# Patient Record
Sex: FEMALE | NOT HISPANIC OR LATINO
[De-identification: names, ages, dates, MRNs, and addresses within clinical notes are randomized per-mention and may not be internally consistent; named-entity substitution may affect disease eponyms.]

---

## 2017-10-13 ENCOUNTER — APPOINTMENT (OUTPATIENT)
Dept: HEART AND VASCULAR | Facility: CLINIC | Age: 67
End: 2017-10-13

## 2018-11-14 ENCOUNTER — APPOINTMENT (OUTPATIENT)
Dept: ORTHOPEDIC SURGERY | Facility: CLINIC | Age: 68
End: 2018-11-14
Payer: MEDICARE

## 2018-11-14 VITALS — BODY MASS INDEX: 26.46 KG/M2 | HEIGHT: 64 IN | WEIGHT: 155 LBS

## 2018-11-14 DIAGNOSIS — M20.22 HALLUX RIGIDUS, LEFT FOOT: ICD-10-CM

## 2018-11-14 DIAGNOSIS — M19.079 PRIMARY OSTEOARTHRITIS, UNSPECIFIED ANKLE AND FOOT: ICD-10-CM

## 2018-11-14 PROCEDURE — 99213 OFFICE O/P EST LOW 20 MIN: CPT

## 2018-11-14 PROCEDURE — 73630 X-RAY EXAM OF FOOT: CPT | Mod: LT

## 2020-11-09 ENCOUNTER — TRANSCRIPTION ENCOUNTER (OUTPATIENT)
Age: 70
End: 2020-11-09

## 2020-11-13 ENCOUNTER — APPOINTMENT (OUTPATIENT)
Dept: ORTHOPEDIC SURGERY | Facility: CLINIC | Age: 70
End: 2020-11-13
Payer: MEDICARE

## 2020-11-13 ENCOUNTER — APPOINTMENT (OUTPATIENT)
Dept: ORTHOPEDIC SURGERY | Facility: CLINIC | Age: 70
End: 2020-11-13

## 2020-11-13 VITALS — RESPIRATION RATE: 16 BRPM | BODY MASS INDEX: 26.46 KG/M2 | WEIGHT: 155 LBS | HEIGHT: 64 IN

## 2020-11-13 DIAGNOSIS — S69.91XD UNSPECIFIED INJURY OF RIGHT WRIST, HAND AND FINGER(S), SUBSEQUENT ENCOUNTER: ICD-10-CM

## 2020-11-13 PROCEDURE — 99072 ADDL SUPL MATRL&STAF TM PHE: CPT

## 2020-11-13 PROCEDURE — 73110 X-RAY EXAM OF WRIST: CPT | Mod: 50

## 2020-11-13 PROCEDURE — 99203 OFFICE O/P NEW LOW 30 MIN: CPT

## 2020-11-13 PROCEDURE — 73140 X-RAY EXAM OF FINGER(S): CPT | Mod: FA,F5

## 2020-11-19 ENCOUNTER — NON-APPOINTMENT (OUTPATIENT)
Age: 70
End: 2020-11-19

## 2021-08-08 ENCOUNTER — EMERGENCY (EMERGENCY)
Facility: HOSPITAL | Age: 71
LOS: 1 days | Discharge: HOME | End: 2021-08-08
Attending: EMERGENCY MEDICINE | Admitting: EMERGENCY MEDICINE
Payer: MEDICARE

## 2021-08-08 VITALS
OXYGEN SATURATION: 96 % | SYSTOLIC BLOOD PRESSURE: 119 MMHG | HEART RATE: 80 BPM | DIASTOLIC BLOOD PRESSURE: 67 MMHG | WEIGHT: 149.91 LBS | TEMPERATURE: 98 F | RESPIRATION RATE: 18 BRPM

## 2021-08-08 VITALS
HEART RATE: 67 BPM | SYSTOLIC BLOOD PRESSURE: 149 MMHG | RESPIRATION RATE: 16 BRPM | OXYGEN SATURATION: 96 % | TEMPERATURE: 98 F | DIASTOLIC BLOOD PRESSURE: 71 MMHG

## 2021-08-08 DIAGNOSIS — M25.551 PAIN IN RIGHT HIP: ICD-10-CM

## 2021-08-08 DIAGNOSIS — M25.461 EFFUSION, RIGHT KNEE: ICD-10-CM

## 2021-08-08 PROCEDURE — 73502 X-RAY EXAM HIP UNI 2-3 VIEWS: CPT | Mod: 26,RT

## 2021-08-08 PROCEDURE — 99284 EMERGENCY DEPT VISIT MOD MDM: CPT | Mod: 25

## 2021-08-08 PROCEDURE — 73700 CT LOWER EXTREMITY W/O DYE: CPT | Mod: 26,RT

## 2021-08-08 PROCEDURE — 73630 X-RAY EXAM OF FOOT: CPT | Mod: 26,RT

## 2021-08-08 PROCEDURE — 73562 X-RAY EXAM OF KNEE 3: CPT | Mod: 26,RT

## 2021-08-08 PROCEDURE — 71101 X-RAY EXAM UNILAT RIBS/CHEST: CPT | Mod: 26,RT

## 2021-08-08 RX ORDER — ACETAMINOPHEN 500 MG
650 TABLET ORAL ONCE
Refills: 0 | Status: COMPLETED | OUTPATIENT
Start: 2021-08-08 | End: 2021-08-08

## 2021-08-08 RX ADMIN — Medication 500 MILLIGRAM(S): at 11:42

## 2021-08-08 RX ADMIN — Medication 650 MILLIGRAM(S): at 11:42

## 2021-08-08 NOTE — ED ADULT TRIAGE NOTE - CHIEF COMPLAINT QUOTE
trip and fall last night. Injuring right hip/ knee and foot. Right knee appears swollen, using crutches unable to tolerate weight on leg.

## 2021-08-08 NOTE — ED ADULT NURSE NOTE - NSIMPLEMENTINTERV_GEN_ALL_ED
Implemented All Fall with Harm Risk Interventions:  [place] to call system. Call bell, personal items and telephone within reach. Instruct patient to call for assistance. Room bathroom lighting operational. Non-slip footwear when patient is off stretcher. Physically safe environment: no spills, clutter or unnecessary equipment. Stretcher in lowest position, wheels locked, appropriate side rails in place. Provide visual cue, wrist band, yellow gown, etc. Monitor gait and stability. Monitor for mental status changes and reorient to person, place, and time. Review medications for side effects contributing to fall risk. Reinforce activity limits and safety measures with patient and family. Provide visual clues: red socks.

## 2021-08-08 NOTE — ED PROVIDER NOTE - PHYSICAL EXAMINATION
RLE: no pain with internal/external rotation of right lower extremity; can extend and elevate right lower extremity off the bed, limited flexion of the right knee.

## 2021-08-08 NOTE — ED PROVIDER NOTE - NSFOLLOWUPINSTRUCTIONS_ED_ALL_ED_FT
KEEP ACE WRAP IN PLACE FOR 7 DAYS FOR COMFORT AND SWELLING.   APPLY ICE FOR 15-20 MINUTES EVERY 1-2 HOURS FOR SWELLING AND COMFORT.   TAKE IBUPROFEN 600MG EVERY 6 HOURS WITH FOOD AS NEEDED FOR PAIN FOR 2-3 DAYS.   TAKE TYLENOL 650MG EVERY 6 HOURS AS NEEDED FOR PAIN FOR 2-3 DAYS.   RETURN TO ER FOR ANY NEW OR CONCERNING SYMPTOMS.   FOLLOW UP WITH YOUR PMD IN 2-3 DAYS.  IF PAIN IS NOT IMPROVING IN 2-3 DAYS, FOLLOW UP WITH DR Villalobos

## 2021-08-08 NOTE — ED PROVIDER NOTE - PATIENT PORTAL LINK FT
You can access the FollowMyHealth Patient Portal offered by [facility] by registering at the following website: [email or web address]. By joining [facility]’s FollowMyHealth portal, you will also be able to view your health information using other applications (apps) compatible with our system.

## 2021-08-08 NOTE — ED PROVIDER NOTE - MUSCULOSKELETAL, MLM
RLE: no pain with internal/external rotation of right lower extremity; can extend and elevate right lower extremity off the bed, limited flexion of the right knee. RLE: No rotation or shortening of the RLE.  no pain with internal/external rotation of right lower extremity; can extend and elevate right lower extremity off the bed, limited flexion of the right knee due to pain ~ 30 deg.  Dorsiflexion/plantar flexion 5/5.  Sensation grossly normal.  Small patellar effusion present.

## 2021-08-08 NOTE — ED PROVIDER NOTE - CLINICAL SUMMARY MEDICAL DECISION MAKING FREE TEXT BOX
70 yo female presents ~ 18 hours after fall from ~ 2 feet with R sided body pain.  Denies head injury, dizziness, headache.  no blood thinning medication use.  On exam: Non focal neurological exam.  no midline C or L spine tenderness.  No RLE shortening.  Small patellar effusion present.  No pain with internal or external rotation of the hip.  extensor mechanism intact.  reviewed imaging (including CT scan).  Spoke with her orthopedist Dr. Villalobos.  Conservative management discussed with the patient in detail - ace wrap, WBAT, OTC pain medication.  Close follow up encouraged.  Strict ED return instructions discussed in detail and patient given the opportunity to ask any questions about their discharge diagnosis and instructions

## 2021-08-08 NOTE — ED PROVIDER NOTE - OBJECTIVE STATEMENT
72 yo female   Patient denies prodromal symptoms; No LOC, headache, dizziness, N/V after the fall. Patient states she has been able to ambulate with pain.   no anti-coagulant use or allergies to medications.   Patient took Tylenol last night without major improvement of pain 72 yo female with history of right ankle arthroscopic surgery in the past presents to the ED complaining of right hip, knee and ankle pain after a mechanical fall last night at 8PM. Patient states she was on a step stool, putting something away in a cupboard when her foot got caught on the baggy pants she was wearing and she fell off the stool (3 steps, approximately 2ft) landing on her right side. Patient denies prodromal symptoms; No head trauma, LOC, headache, dizziness, N/V after the fall. Patient states she has been able to ambulate with the assistance of crutches due to pain. Patient reports no anti-coagulant use or allergies to medications. Patient took Tylenol last night with minimal improvement of pain. 70 yo female with history of right ankle arthroscopic surgery in the past presents to the ED complaining of right hip, knee and ankle pain after a mechanical fall last night at 8PM. Patient states she was on a step stool, putting something away in a cupboard when her foot got caught on the baggy pants she was wearing and she fell off the stool (3 steps, approximately 2ft) landing on her right side. Patient denies prodromal symptoms; No head trauma, LOC, neck/back/abdominal/chest pain, headache, dizziness, N/V, vision changes after the fall. Patient states she has been able to ambulate with the assistance of crutches due to pain. Patient reports no anti-coagulant use or allergies to medications. Patient took Tylenol last night with minimal improvement of pain.

## 2021-08-08 NOTE — ED ADULT NURSE REASSESSMENT NOTE - NS ED NURSE REASSESS COMMENT FT1
Pt received from virgie MINER. Pt resting comfortably in bed. No s/s of acute distress. Will continue to monitor

## 2022-07-18 ENCOUNTER — APPOINTMENT (OUTPATIENT)
Dept: ORTHOPEDIC SURGERY | Facility: CLINIC | Age: 72
End: 2022-07-18

## 2022-07-18 VITALS — RESPIRATION RATE: 16 BRPM | HEIGHT: 64 IN | BODY MASS INDEX: 26.46 KG/M2 | WEIGHT: 155 LBS

## 2022-07-18 DIAGNOSIS — M67.471 GANGLION, RIGHT ANKLE AND FOOT: ICD-10-CM

## 2022-07-18 DIAGNOSIS — M79.671 PAIN IN RIGHT FOOT: ICD-10-CM

## 2022-07-18 PROCEDURE — 99203 OFFICE O/P NEW LOW 30 MIN: CPT

## 2022-07-18 PROCEDURE — 76882 US LMTD JT/FCL EVL NVASC XTR: CPT

## 2022-07-25 PROBLEM — M79.671 RIGHT FOOT PAIN: Status: ACTIVE | Noted: 2022-07-18

## 2023-02-22 ENCOUNTER — NON-APPOINTMENT (OUTPATIENT)
Age: 73
End: 2023-02-22

## 2023-05-18 ENCOUNTER — APPOINTMENT (OUTPATIENT)
Dept: ORTHOPEDIC SURGERY | Facility: CLINIC | Age: 73
End: 2023-05-18

## 2024-09-08 NOTE — ED PROVIDER NOTE - SKIN, MLM
PAIN SCALE 7 OF 10. Skin normal color for race, warm, dry and intact. No evidence of rash. No evidence of rash.

## 2024-12-18 ENCOUNTER — EMERGENCY (EMERGENCY)
Facility: HOSPITAL | Age: 74
LOS: 1 days | Discharge: HOME | End: 2024-12-18
Admitting: EMERGENCY MEDICINE
Payer: MEDICARE

## 2024-12-18 VITALS
HEART RATE: 75 BPM | DIASTOLIC BLOOD PRESSURE: 84 MMHG | RESPIRATION RATE: 16 BRPM | SYSTOLIC BLOOD PRESSURE: 126 MMHG | OXYGEN SATURATION: 97 % | TEMPERATURE: 98 F

## 2024-12-18 PROCEDURE — 73130 X-RAY EXAM OF HAND: CPT | Mod: 26,RT

## 2024-12-18 PROCEDURE — 73140 X-RAY EXAM OF FINGER(S): CPT | Mod: 26,XE,RT

## 2024-12-18 PROCEDURE — 99283 EMERGENCY DEPT VISIT LOW MDM: CPT

## 2024-12-18 NOTE — ED ADULT NURSE NOTE - OBJECTIVE STATEMENT
pt c/o rght thumb pain since yesterday. Denies fall or trauma. Hx surgery on that thumb 20 yrs prior. no obvious deformity/discoloration/swelling.

## 2024-12-18 NOTE — ED ADULT NURSE NOTE - SUICIDE SCREENING QUESTION 3
No Informed pt about surgery time 0930, last meal 0000 no dairy, and last drink 0630 of water or apple juice 3 hrs before surgery. Bring photo ID and isurance card to the first floor. Must have an escort that is 18+. Leave jewelry, piercings, and contacts at home./yes

## 2024-12-18 NOTE — ED PROVIDER NOTE - NSFOLLOWUPINSTRUCTIONS_ED_ALL_ED_FT
no complications
Finger Sprain, Adult  A finger sprain is a tear or stretch in a ligament in a finger. Ligaments are tissues that connect bones to each other.    What are the causes?  Finger sprains happen when something makes the bones in the hand move in an abnormal way. They are often caused by a fall or an accident.    What increases the risk?  This condition is more likely to develop in people who:  Participate in sports in which it is easy to fall, such as skiing.  Play sports that involve catching an object, such as basketball.  Have poor strength and flexibility.  What are the signs or symptoms?  Symptoms of this condition include:  Pain or tenderness in the finger.  Swelling in the finger.  A bluish appearance to the finger.  Bruising.  Difficulty bending and flexing the finger.  How is this diagnosed?  This condition is diagnosed with an exam of your finger. Your health care provider may take an X-ray to see if any bones are broken or dislocated.    How is this treated?  Hand showing finger splint on index and middle fingers and wrist.  Treatment for this condition depends on how severe the sprain is. It may involve:  Preventing the finger from moving for a period of time. Your finger may be wrapped in a bandage (dressing) or splint, or your finger may be taped to the fingers beside it (kirsten taping).  Medicines for pain.  Exercises to strengthen the finger. These may be recommended when the finger has healed.  Surgery to reconnect the ligament to a bone. This may be done if the ligament was completely torn.  Follow these instructions at home:  If you have a removable splint:    Wear the splint as told by your health care provider. Remove it only as told by your health care provider.  Check the skin around the splint every day. Tell your health care provider about any concerns.  Loosen the splint if your fingers tingle, become numb, or turn cold and blue.  Keep the splint clean.  If the splint is not waterproof:  Do not let it get wet.  Cover it with a watertight covering when you take a bath or shower.  Managing pain, stiffness, and swelling    If directed, put ice on the injured area. To do this:  If you have a removable splint, remove it as told by your health care provider.  Put ice in a plastic bag.  Place a towel between your skin and the bag.  Leave the ice on for 20 minutes, 2–3 times a day.  Remove the ice if your skin turns bright red. This is very important. If you cannot feel pain, heat, or cold, you have a greater risk of damage to the area.  Move your fingers often to reduce stiffness and swelling.  Raise (elevate) the injured area above the level of your heart while you are sitting or lying down.  Medicines    Take over-the-counter and prescription medicines only as told by your health care provider.  Ask your health care provider if the medicine prescribed to you requires you to avoid driving or using machinery.  General instructions    Keep any dressings dry until your health care provider says they can be removed.  If your fingers are kirsten taped, replace your kirsten taping as told by your health care provider.  Do exercises as told by your health care provider or physical therapist.  Do not wear rings on your injured finger.  Keep all follow-up visits. This is important.  Contact a health care provider if:  Your pain is not controlled with medicine.  Your bruising or swelling gets worse.  Your splint is damaged.  You develop a fever.  Get help right away if:  Your finger is numb or blue.  Your finger feels colder to the touch than normal.  Summary  A finger sprain is a tear or stretch in a ligament in a finger. Ligaments are tissues that connect bones to each other.  Finger sprains happen when something makes the bones in the hand move in an abnormal way. They are often caused by a fall or accident.  This condition is diagnosed with an exam of your finger. Your health care provider may do an X-ray to see if any bones are broken or dislocated.  Treatment for this condition depends on how severe the sprain is. Treatment may involve kirsten taping or wearing a splint. Surgery to reconnect the ligament to a bone may be needed if the ligament was torn all the way.  This information is not intended to replace advice given to you by your health care provider. Make sure you discuss any questions you have with your health care provider.

## 2024-12-18 NOTE — ED PROVIDER NOTE - PATIENT PORTAL LINK FT
You can access the FollowMyHealth Patient Portal offered by [facility] by registering at the following website: [email or web address]. By joining [facility]’s FollowMyHealth portal, you will also be able to view your health information using other applications (apps) compatible with our system.

## 2024-12-18 NOTE — ED PROVIDER NOTE - PHYSICAL EXAMINATION
· CONSTITUTIONAL: Well appearing, well nourished, awake, alert, oriented to person, place, time/situation and in no apparent distress.  · HEAD: Head atraumatic, normal cephalic shape.  · HEAD: Head is atraumatic. Head shape is symmetrical.  · CARDIAC: Normal rate, regular rhythm.  Heart sounds S1, S2.  No murmurs, rubs or gallops.  · RESPIRATORY: Breath sounds clear and equal bilaterally.  · GASTROINTESTINAL: Abdomen soft, non-tender, no guarding.  · MUSCULOSKELETAL: Rt hand- +TTP base of rt thumb, FROM, SILT, Cap refill < 3 sec, soft nt compartments. Spine appears normal, range of motion is not limited, no muscle or joint tenderness  · NEUROLOGICAL: Alert and oriented, no focal deficits, no motor or sensory deficits.  · SKIN: Skin normal color for race, warm, dry and intact. No evidence of rash.  · PSYCHIATRIC: Alert and oriented to person, place, time/situation. normal mood and affect. no apparent risk to self or others.

## 2024-12-18 NOTE — ED PROVIDER NOTE - OBJECTIVE STATEMENT
74-year-old female past medical history of hyperlipidemia presents today with right thumb pain for the last several days.  Patient think she might of jammed it however does not recall an exact injury.  She notes remote history of ligament repair in that thumb about 20 years ago.  Denies paresthesias, muscle weakness or other injuries

## 2024-12-18 NOTE — ED ADULT NURSE NOTE - NS ED PATIENT SAFETY CONCERN
12:50 PM    Refill approved by [facility] and sent to PCP for final approval.    Horace Barnett RN, BSN, PHN  [facility]   [place] # [id number]  [phone]         No

## 2024-12-18 NOTE — ED ADULT TRIAGE NOTE - SPO2 (%)
Detail Level: Detailed Quality 130: Documentation Of Current Medications In The Medical Record: Current Medications Documented 97

## 2024-12-18 NOTE — ED PROVIDER NOTE - CLINICAL SUMMARY MEDICAL DECISION MAKING FREE TEXT BOX
74-year-old female past medical history of hyperlipidemia presents today with right thumb pain for the last several days.  Patient think she might of jammed it however does not recall an exact injury.  She notes remote history of ligament repair in that thumb about 20 years ago  X-ray with chronic ossification, no acute findings.  Patient has a hand specialist to follow-up with however orthopedics follow-up given.  Patient with full range of motion, thumb spica given likely finger sprain.  No scaphoid tenderness.  Return precaution discussed with the patient

## 2024-12-20 DIAGNOSIS — M79.644 PAIN IN RIGHT FINGER(S): ICD-10-CM

## 2024-12-20 DIAGNOSIS — E78.5 HYPERLIPIDEMIA, UNSPECIFIED: ICD-10-CM
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
## 2025-01-13 ENCOUNTER — APPOINTMENT (OUTPATIENT)
Dept: ORTHOPEDIC SURGERY | Facility: CLINIC | Age: 75
End: 2025-01-13
Payer: MEDICARE

## 2025-01-13 VITALS — RESPIRATION RATE: 16 BRPM | HEIGHT: 64 IN | BODY MASS INDEX: 25.61 KG/M2 | WEIGHT: 150 LBS

## 2025-01-13 DIAGNOSIS — M79.644 PAIN IN RIGHT FINGER(S): ICD-10-CM

## 2025-01-13 DIAGNOSIS — Z85.6 PERSONAL HISTORY OF LEUKEMIA: ICD-10-CM

## 2025-01-13 PROCEDURE — 99214 OFFICE O/P EST MOD 30 MIN: CPT

## 2025-01-13 PROCEDURE — 73110 X-RAY EXAM OF WRIST: CPT | Mod: 50

## 2025-01-13 RX ORDER — ROSUVASTATIN CALCIUM 5 MG/1
TABLET, FILM COATED ORAL
Refills: 0 | Status: ACTIVE | COMMUNITY

## 2025-05-28 ENCOUNTER — EMERGENCY (EMERGENCY)
Age: 75
LOS: 1 days | End: 2025-05-28
Attending: EMERGENCY MEDICINE | Admitting: EMERGENCY MEDICINE
Payer: MEDICARE

## 2025-05-28 VITALS
TEMPERATURE: 98 F | HEART RATE: 77 BPM | OXYGEN SATURATION: 100 % | SYSTOLIC BLOOD PRESSURE: 158 MMHG | RESPIRATION RATE: 18 BRPM | DIASTOLIC BLOOD PRESSURE: 69 MMHG | WEIGHT: 149.91 LBS

## 2025-05-28 DIAGNOSIS — M25.571 PAIN IN RIGHT ANKLE AND JOINTS OF RIGHT FOOT: ICD-10-CM
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
## 2025-05-28 DIAGNOSIS — S92.902A UNSPECIFIED FRACTURE OF LEFT FOOT, INITIAL ENCOUNTER FOR CLOSED FRACTURE: ICD-10-CM

## 2025-05-28 DIAGNOSIS — S93.401A SPRAIN OF UNSPECIFIED LIGAMENT OF RIGHT ANKLE, INITIAL ENCOUNTER: ICD-10-CM
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
## 2025-05-28 PROCEDURE — 73630 X-RAY EXAM OF FOOT: CPT | Mod: 26,LT

## 2025-05-28 PROCEDURE — 99284 EMERGENCY DEPT VISIT MOD MDM: CPT

## 2025-05-28 PROCEDURE — 73610 X-RAY EXAM OF ANKLE: CPT | Mod: 26,RT

## 2025-05-28 RX ORDER — ACETAMINOPHEN 500 MG/5ML
650 LIQUID (ML) ORAL ONCE
Refills: 0 | Status: COMPLETED | OUTPATIENT
Start: 2025-05-28 | End: 2025-05-28

## 2025-05-29 PROBLEM — E78.5 HYPERLIPIDEMIA, UNSPECIFIED: Chronic | Status: ACTIVE | Noted: 2024-12-18

## 2025-06-09 ENCOUNTER — APPOINTMENT (OUTPATIENT)
Dept: ORTHOPEDIC SURGERY | Facility: CLINIC | Age: 75
End: 2025-06-09

## 2025-08-25 ENCOUNTER — NON-APPOINTMENT (OUTPATIENT)
Age: 75
End: 2025-08-25

## 2025-08-27 ENCOUNTER — APPOINTMENT (OUTPATIENT)
Dept: ORTHOPEDIC SURGERY | Facility: CLINIC | Age: 75
End: 2025-08-27
Payer: MEDICARE

## 2025-08-27 VITALS
BODY MASS INDEX: 25.61 KG/M2 | HEART RATE: 76 BPM | DIASTOLIC BLOOD PRESSURE: 84 MMHG | OXYGEN SATURATION: 98 % | SYSTOLIC BLOOD PRESSURE: 146 MMHG | HEIGHT: 64 IN | WEIGHT: 150 LBS | TEMPERATURE: 98.3 F

## 2025-08-27 DIAGNOSIS — M25.572 PAIN IN LEFT ANKLE AND JOINTS OF LEFT FOOT: ICD-10-CM

## 2025-08-27 PROCEDURE — 99214 OFFICE O/P EST MOD 30 MIN: CPT

## 2025-08-27 PROCEDURE — 73600 X-RAY EXAM OF ANKLE: CPT | Mod: LT

## 2025-08-27 PROCEDURE — 73630 X-RAY EXAM OF FOOT: CPT | Mod: LT

## 2025-08-27 RX ORDER — FINASTERIDE 1 MG/1
1 TABLET ORAL
Refills: 0 | Status: ACTIVE | COMMUNITY

## 2025-08-28 LAB
CRP SERPL-MCNC: <3 MG/L
ERYTHROCYTE [SEDIMENTATION RATE] IN BLOOD BY WESTERGREN METHOD: 30 MM/HR
HCT VFR BLD CALC: 39.7 %
HGB BLD-MCNC: 12.6 G/DL
MCHC RBC-ENTMCNC: 29.4 PG
MCHC RBC-ENTMCNC: 31.7 G/DL
MCV RBC AUTO: 92.8 FL
PLATELET # BLD AUTO: 244 K/UL
RBC # BLD: 4.28 M/UL
RBC # FLD: 13.1 %
URATE SERPL-MCNC: 5.7 MG/DL
WBC # FLD AUTO: 20.96 K/UL

## 2025-09-02 ENCOUNTER — APPOINTMENT (OUTPATIENT)
Dept: ORTHOPEDIC SURGERY | Facility: CLINIC | Age: 75
End: 2025-09-02
Payer: MEDICARE

## 2025-09-02 VITALS — DIASTOLIC BLOOD PRESSURE: 68 MMHG | HEART RATE: 69 BPM | SYSTOLIC BLOOD PRESSURE: 146 MMHG

## 2025-09-02 DIAGNOSIS — M79.672 PAIN IN LEFT FOOT: ICD-10-CM

## 2025-09-02 DIAGNOSIS — S92.215A NONDISPLACED FRACTURE OF CUBOID BONE OF LEFT FOOT, INITIAL ENCOUNTER FOR CLOSED FRACTURE: ICD-10-CM

## 2025-09-02 PROCEDURE — 99213 OFFICE O/P EST LOW 20 MIN: CPT

## 2025-09-25 PROBLEM — S93.491S SPRAIN OF ANTERIOR TALOFIBULAR LIGAMENT OF RIGHT ANKLE, SEQUELA: Status: ACTIVE | Noted: 2025-09-25
